# Patient Record
Sex: FEMALE | Race: WHITE | NOT HISPANIC OR LATINO | Employment: UNEMPLOYED | URBAN - METROPOLITAN AREA
[De-identification: names, ages, dates, MRNs, and addresses within clinical notes are randomized per-mention and may not be internally consistent; named-entity substitution may affect disease eponyms.]

---

## 2023-06-23 PROBLEM — J43.9 PULMONARY EMPHYSEMA: Status: ACTIVE | Noted: 2023-06-23

## 2023-11-10 PROBLEM — J93.9 PNEUMOTHORAX ON LEFT: Status: ACTIVE | Noted: 2023-11-10

## 2023-11-11 PROBLEM — K21.9 GASTROESOPHAGEAL REFLUX DISEASE: Status: ACTIVE | Noted: 2023-11-11

## 2023-11-11 PROBLEM — F32.A DEPRESSION: Status: ACTIVE | Noted: 2023-11-11

## 2023-11-11 PROBLEM — T79.7XXA SUBCUTANEOUS EMPHYSEMA: Status: ACTIVE | Noted: 2023-11-11

## 2023-11-12 PROBLEM — J96.21 ACUTE ON CHRONIC RESPIRATORY FAILURE WITH HYPOXIA: Status: ACTIVE | Noted: 2023-11-12

## 2023-11-12 PROBLEM — F41.9 ANXIETY: Status: ACTIVE | Noted: 2023-11-12

## 2023-11-14 PROBLEM — J44.9 COPD (CHRONIC OBSTRUCTIVE PULMONARY DISEASE): Status: ACTIVE | Noted: 2023-11-14

## 2023-11-21 PROBLEM — J95.811: Status: ACTIVE | Noted: 2023-11-21

## 2023-11-29 PROBLEM — J98.09 BRONCHIAL OBSTRUCTION: Status: ACTIVE | Noted: 2023-11-29

## 2023-12-03 PROBLEM — J44.1 COPD EXACERBATION: Status: ACTIVE | Noted: 2023-12-03

## 2023-12-03 PROBLEM — J44.1 COPD EXACERBATION: Status: ACTIVE | Noted: 2023-11-14

## 2023-12-06 PROBLEM — J86.0 BRONCHOPLEURAL FISTULA: Status: ACTIVE | Noted: 2023-12-06

## 2023-12-09 PROBLEM — E46 HYPOALBUMINEMIA DUE TO PROTEIN-CALORIE MALNUTRITION: Status: ACTIVE | Noted: 2023-12-09

## 2023-12-09 PROBLEM — D72.10 EOSINOPHILIA: Status: ACTIVE | Noted: 2023-12-09

## 2023-12-09 PROBLEM — E87.4 METABOLIC ALKALOSIS WITH RESPIRATORY ACIDOSIS: Status: ACTIVE | Noted: 2023-12-09

## 2023-12-09 PROBLEM — E88.09 HYPOALBUMINEMIA DUE TO PROTEIN-CALORIE MALNUTRITION: Status: ACTIVE | Noted: 2023-12-09

## 2023-12-11 PROBLEM — R09.82 POST-NASAL DRIP: Status: ACTIVE | Noted: 2023-12-11

## 2023-12-11 PROBLEM — E44.0 MODERATE PROTEIN-CALORIE MALNUTRITION: Status: ACTIVE | Noted: 2023-12-11

## 2023-12-20 ENCOUNTER — PATIENT OUTREACH (OUTPATIENT)
Dept: ADMINISTRATIVE | Facility: OTHER | Age: 63
End: 2023-12-20

## 2023-12-20 ENCOUNTER — TELEPHONE (OUTPATIENT)
Dept: ADMINISTRATIVE | Facility: CLINIC | Age: 63
End: 2023-12-20

## 2023-12-20 NOTE — PROGRESS NOTES
Pt request help with food insecurity. Gave pt number to enroll in [facility] On A[facility] [phone] or [phone]. Sending pt pantry food locations also.  Pt is on oxygen 24/7. I will follow on [facility] platform.

## 2023-12-20 NOTE — PROGRESS NOTES
CHW - Initial Contact    This Community Health Worker completed OR updated the Social Determinant of Health questionnaire with patient via telephone today.    Pt identified barriers of most importance are: Pt request help with food insecurity. Gave pt number to enroll in [facility] [phone] or [phone]. Sending pt pantry food locations also.  Pt is on oxygen 24/7.   Referrals to community agencies completed with patient/caregiver consent outside of [facility] include: yes  Referrals were put through [facility] - no:   Support and Services: Food Pantry, Food Delivery  Other information discussed the patient needs / wants help with: SDOH   Follow up required: yes  Follow-up Outreach - Due: 1/4/2024     
medium/compressible

## 2024-01-04 ENCOUNTER — PATIENT OUTREACH (OUTPATIENT)
Dept: ADMINISTRATIVE | Facility: OTHER | Age: 64
End: 2024-01-04

## 2024-02-12 PROBLEM — J96.21 ACUTE ON CHRONIC RESPIRATORY FAILURE WITH HYPOXIA: Status: RESOLVED | Noted: 2023-11-12 | Resolved: 2024-02-12

## 2024-02-23 PROBLEM — Z98.890 S/P BRONCHOSCOPY: Status: ACTIVE | Noted: 2024-02-23

## 2024-02-23 PROBLEM — E78.5 HYPERLIPIDEMIA: Status: ACTIVE | Noted: 2024-02-23

## 2024-02-23 PROBLEM — J96.11 CHRONIC HYPOXIC RESPIRATORY FAILURE: Status: ACTIVE | Noted: 2024-02-23

## 2024-02-24 PROBLEM — R07.9 CHEST PAIN: Status: ACTIVE | Noted: 2024-02-24

## 2024-02-26 PROBLEM — J93.12 SECONDARY SPONTANEOUS PNEUMOTHORAX: Status: ACTIVE | Noted: 2024-02-26

## 2024-02-27 PROBLEM — J93.12 SECONDARY SPONTANEOUS PNEUMOTHORAX: Status: RESOLVED | Noted: 2024-02-26 | Resolved: 2024-02-27

## 2024-02-27 PROBLEM — R07.9 CHEST PAIN: Status: RESOLVED | Noted: 2024-02-24 | Resolved: 2024-02-27

## 2024-02-28 ENCOUNTER — NURSE TRIAGE (OUTPATIENT)
Dept: ADMINISTRATIVE | Facility: CLINIC | Age: 64
End: 2024-02-28

## 2024-02-28 NOTE — TELEPHONE ENCOUNTER
[place]    PCP:  Savana Guzmán, NP    Pt escalated to PD Day 1 Tracker queue for worsening symptoms.  S/P hospital discharge on 2/27 for COPD, Anxiety, Centrilobular Emphysema, Hyperlipidemia, Chronic Hypoxic Respiratory Failure, CP, and Secondary Spontaneous Pneumothorax.  C/O excessive coughing, anxiety during coughing spells, ongoing SOB with activity, and ongoing difficulty clearing airway.  Denies fever and CP.  She is currently taking Prednisone.  She reports the only worsening symptom is the cough.  Per protocol, care advised is discuss with PCP and callback by Nurse within 1 hr.  Message routed high priority to Pulmonology.  Also, advised to contact her PCP for recommendations.  Pt VU.  Advised to call for worsening/questions/concerns.  VU.    Reason for Disposition   Condition / symptoms WORSE    Additional Information   Negative: Sounds like a life-threatening emergency to the triager   Negative: Patient sounds very sick or weak to the triager   Negative: Sounds like a serious complication to the triager    Protocols used: Post-Hospitalization Follow-up Call-A-[place]

## 2024-03-04 ENCOUNTER — PATIENT OUTREACH (OUTPATIENT)
Dept: ADMINISTRATIVE | Facility: OTHER | Age: 64
End: 2024-03-04

## 2024-03-04 NOTE — PROGRESS NOTES
CHW - Follow Up    This Community Health Worker completed a follow up visit with patient via telephone today.  Pt/Caregiver reported: Pt states she has been in the hospital in [place] and needs to gather her mail.  Community Health Worker provided:  I am resending resources to pt.   Follow up required: yes  Follow-up Outreach - Due: 3/18/2024

## 2024-03-07 ENCOUNTER — TELEPHONE (OUTPATIENT)
Dept: PHARMACY | Facility: CLINIC | Age: 64
End: 2024-03-07

## 2024-03-07 ENCOUNTER — PATIENT OUTREACH (OUTPATIENT)
Dept: ADMINISTRATIVE | Facility: OTHER | Age: 64
End: 2024-03-07

## 2024-03-07 ENCOUNTER — TELEPHONE (OUTPATIENT)
Dept: ADMINISTRATIVE | Facility: CLINIC | Age: 64
End: 2024-03-07

## 2024-03-07 NOTE — TELEPHONE ENCOUNTER
I have spoken with Kriss Bright and informed her of the Az&Me application process for Breztri and what's required to apply.  Kriss Bright will provide the following documents: Proof of household Income( such as social security statement, 1099 form, pension statement or 3 consecutive pay stubs, Copy of all Insurance cards( front and back), Printout from your Insurance or Pharmacy that shows how much you have spent on prescriptions this year, and Signed and dated HIPAA /Patient Information Forms        I will follow up with the patient in 5 business days.

## 2024-03-07 NOTE — TELEPHONE ENCOUNTER
I have confirmed with Ms. Bright by MY CHART that she does not need prescription assistance at this time. Ms. Bright stated   Never mind. It would take an entirely long period of time together all of that information. We will manage some way. Thank you for your offer to help.

## 2024-03-07 NOTE — PROGRESS NOTES
CHW - Follow Up    This Community Health Worker completed a follow up visit with patient via telephone today.  Pt/Caregiver reported: Pt is requesting assistance paying for [employer]. Can you please assist?    Community Health Worker provided: Pt is requesting assistance paying for [employer]. Can you please assist? Sent in basket request to Pulmonary.  Follow up required: yes  Follow-up Outreach - Due: 3/14/2024

## 2024-03-07 NOTE — PROGRESS NOTES
Pt is requesting assistance paying for [employer]. Sent in basket request to Pharmacy pt assistance team and pulmonary for assistance. I will follow on [facility] platform.

## 2024-03-07 NOTE — LETTER
March 7, 2024    Kriss Bright  [address] [address]             [facility] - Pharmacy Assistance  [address]  [facility] [address]  Fax: [phone] Dear Ms. Kriss Bright     It was a pleasure speaking with you. To follow up on our conversation on 3/7/2024, the Pharmacy Patient Assistance Program needs more information from you before we can submit your Breztri application to the Az&Me Program. Please return the following documents to the Pharmacy Patient Assistance office ASAP.  Fax requested documentation to [phone] or email [email or web address]. Documentation can also be mailed to address listed below       Proof of household Income( such as social security statement, 1099 form, pension statement or 3 consecutive pay stubs  Copy of all Insurance cards( front and back)  Printout from your Insurance or Pharmacy that shows how much you have spent on prescriptions this year  Signed and dated HIPAA /Patient Information Forms       Whats Next:     Once I receive your documentation and authorization from your Provider, your application will be submitted to the [facility] Assistance Program for review. Please be advised it will take 2 to 4 weeks for your application to be processed so you may have to purchase a month's supply of medication from your pharmacy to hold you over during the waiting period. You will be notified of approval or denial by The Program(mail) or myself.      If you have any questions or concerns, please give me a call         Sincerely   Barbara Pederson   Pharmacy Patient Assistance  [address] [facility] [address]  [place] [address]  Phone: [phone]  Fax: [phone]  Email: [email or web address]

## 2024-03-20 ENCOUNTER — PATIENT OUTREACH (OUTPATIENT)
Dept: ADMINISTRATIVE | Facility: OTHER | Age: 64
End: 2024-03-20

## 2024-03-26 NOTE — PROGRESS NOTES
CHW - Follow Up    This Community Health Worker completed a follow up visit with patient via telephone today.  Pt/Caregiver reported: Pt states she did get medication paperwork together for pharmacy pt assistance pharmacy. She was just overwhelmed at the time and didn't want to deal with it.   Community Health Worker provided: Gave pt PPAT number to follow up process.  Follow up required: yes  Follow-up Outreach - Due: 4/9/2024

## 2024-04-08 ENCOUNTER — PATIENT OUTREACH (OUTPATIENT)
Dept: ADMINISTRATIVE | Facility: OTHER | Age: 64
End: 2024-04-08

## 2024-04-08 NOTE — PROGRESS NOTES
CHW - Follow Up    This Community Health Worker completed a follow up visit with patient via telephone today.  Pt/Caregiver reported: Pt states she doesn't know if see sent in paperwork for AZ&me. She will check anf let me know when I call her in 2 weeks.    Community Health Worker provided:  If she is denied, I will asked doctor to change pt medication to an affordable one.   Follow up required:yes   Follow-up Outreach - Due: 4/22/2024

## 2024-04-19 ENCOUNTER — PATIENT OUTREACH (OUTPATIENT)
Dept: ADMINISTRATIVE | Facility: OTHER | Age: 64
End: 2024-04-19

## 2024-05-03 NOTE — PROGRESS NOTES
CHW - Case Closure    This Community Health Worker spoke to patient via telephone today.   Pt/Caregiver reported: Pt's insurance does pay for medication. She does not need assistance at this time.   Pt/Caregiver denied any additional needs at this time and agrees with episode closure at this time.  Provided patient with Community Health Worker's contact information and encouraged him/her to contact this Community Health Worker if additional needs arise.

## 2024-05-27 PROBLEM — J96.11 CHRONIC HYPOXIC RESPIRATORY FAILURE: Status: RESOLVED | Noted: 2024-02-23 | Resolved: 2024-05-27

## 2025-01-16 ENCOUNTER — TELEPHONE (OUTPATIENT)
Dept: TRANSPLANT | Facility: CLINIC | Age: 65
End: 2025-01-16

## 2025-01-17 ENCOUNTER — TELEPHONE (OUTPATIENT)
Dept: TRANSPLANT | Facility: CLINIC | Age: 65
End: 2025-01-17

## 2025-01-17 NOTE — LETTER
January 23, 2025    Samantha Lane  [address] [place] [address]  Phone: [phone]  Fax: [phone]        Dear Samantha Lane:    Patient: Kriss Bright   MR Number: [id number]   YOB: 1960     Thank you for the referral of Kriss Bright to our lung transplant program. Upon reviewing the information provided by your office, we find that Kriss Bright is not a potential candidate for lung transplantation at [facility] due to the following:      She is too highly sensitized. I would recommend referral to [facility] in [place] or other high volume centers.      Once again, we appreciate your referral to our center and we look forward to working with you in the future. If you have any questions or concerns regarding this decision, then please do not hesitate to contact me at [phone].    Sincerely,       Cristiana Watson D.O.  Medical Director, Lung Transplant  Pulmonary & Critical Care Medicine    [facility] [place]  [address] [place] [address]  [phone]

## 2025-01-17 NOTE — TELEPHONE ENCOUNTER
"1/23/25 - Decline letter sent to referring provider.    ----- Message from Cristiana Watson DO sent at 1/17/2025 12:32 PM CST -----    Regarding: RE: Lung Transplant Referral    cPRA is 95%... too highly sensitized for [facility] and she will be too highly sensitized for us as well.  Would recommend [facility] or other high volume centers for consideration.    ----- Message -----  From: La Courtney RN  Sent: 1/16/2025   7:00 PM CST  To: Cristiana Watson DO  Subject: Lung Transplant Referral                         Lung Transplant Referral Note    Referral from: [facility]    Lung diagnosis: COPD/Emphysema,     Age: 64 y.o.    Height/Weight/BMI:  HT: 5' 1.7" / WT:  79.7 kg /Body mass index is 32.5 kg/m².    Smoking history: Social History    Tobacco Use      Smoking status: Former        Packs/day: 0.00        Years: 1 pack/day for 40.0 years (40.0 ttl pk-yrs)        Types: Cigarettes        Start date: 1982        Quit date: 2022        Years since quitting: 3.0      Smokeless tobacco: Not on file  Nicotine (-); Cotinine elevated in 9/2024;  Per [facility] note: Last used vape one time in 9/13/24 . Started in 2020 and used occasionally. Quit a few months ago    PFT date: 12/19/2024  FVC          2.02 / 74.5  FEV1        0.51 / 23.7  FEV1/FVC     25  TLC          6.05 / 133.2  DLCO       5.60 / 31.2    6MWT date: 12/19/2024  1004 feet with O2 @6L; 86% on RA at rest; desaturated to 89% on 6L with exertion    CXR date: 12/17/2024  Impression:  Heart and mediastinum: Cardiomediastinal silhouette is normal in contour. Calcified right hilar lymph nodes.   Lungs and pleura: No pleural effusion or pneumothorax. Left upper lobe endobronchial valves with complete left upper lobe collapse. There is a 0.7 cm calcified granuloma in the right middle lobe. Extensive emphysematous and bullous changes throughout.   Bones and soft tissues: No acute osseous abnormality.       Chest CT date: 09/16/2024  Impression: "   1.    Severe emphysema.   2.    Endobronchial valves left upper lobe with complete upper lobe collapse.   3.    Large bulla in the left upper thorax.       Echo date: 12/19/204  Impression: LV EF is normal. Overall wall motion is normal.   RV systolic function is normal.   Normal diastolic function and LV filling pressures.       Other pertinent medical history:  depression, bipolar disorder, endobronchial valve placement with removal of valve from RLL on 5/13/24; left pneumothorax  ( EBV placed in 11/2023 at [facility]. She had EBV PAULETTE and post procedure Left pneumothorax and one of the four EBV were removed, placed chest tubes x 2 and used blood patch x 2. Was hospitalized for almost 2 weeks. By 1/25/24, 4 valves in the left side were removed . Bronchoscopy was done on 3/1/24 and 9 mm SVS in LB 7+8 (LLL charla-medial segment) and SVS 7 mm valve in LB5 were placed. In 5/2024, the zephyr valve from the right lower lobe superior segment was removed. Still has EBvalves in the left upper lobe)    Referred to [facility] - evaluation prolonged due to chewing 2 THC gummies  Declined at [facility]: per note on 1/10/25:  Declined- Highly sensitized   Surgical high risk (pneumothorax and chest tubes)  Psychosocial high risk.   Advised patient to follow up on the osteoporosis and Acid reflux     Class I: cPRA: 95%   A2, A68, A69, B7, B8, B18, B27, B35, B39, B41, B42, B45, B50, B54, B55, B56, B57, B60, B61, B62, B64, B67, B71, B72, B75, B76, B78, B81, B82    Class II: cPRA: 83%  DR8, DR11, DR12, DR13, DR14, DR15, DR16, DR17, DR18, DR52    Recommendations?

## 2025-01-17 NOTE — LETTER
January 23, 2025    Samantha Lane  [address] [place] [address]  Phone: [phone]  Fax: [phone]        Dear Samantha Lane:    Patient: Kriss Bright   MR Number: [id number]   YOB: 1960     Thank you for the referral of Kriss Bright to our lung transplant program. Upon reviewing the information provided by your office, we find that Kriss Bright is not a potential candidate for lung transplantation at [facility] due to the following:      She is too highly sensitized. I would recommend referral to [facility] or other high volume centers for lung transplant consideration.      Once again, we appreciate your referral to our center and we look forward to working with you in the future. If you have any questions or concerns regarding this decision, then please do not hesitate to contact me at [phone].    Sincerely,       Cristiana Watson D.O.  Medical Director, Lung Transplant  Pulmonary & Critical Care Medicine    [facility]  [address] [place] [address]  [phone]

## 2025-02-03 ENCOUNTER — TELEPHONE (OUTPATIENT)
Dept: TRANSPLANT | Facility: CLINIC | Age: 65
End: 2025-02-03

## 2025-02-03 NOTE — TELEPHONE ENCOUNTER
"----- Message from [facility] sent at 2/3/2025 10:01 AM CST -----  Consult/Advisory    Name Of Caller: Denilson Bright (Spouse)    Contact Preference?: [phone]    What is the nature of the call?: Requesting clarification of referral status    Additional Notes:  "Thank you for all that you do for our patients"    Contacted patient and her , Denilson. Mr. Oreilly stated that they had not received any communication regarding the lung transplant referral, therefore, they wanted to reach out. Notified them both that patient is not a candidate for lung transplant at our center. Informed them that she is highly sensitized. Explained to them both what that means. Notified them of Dr. Watson's instructions for a referral to a high volume center like [facility] since she was declined at [facility]. Informed them that a letter was sent to [facility], regarding this. Informed them that I would reach out to Samantha'ss office to determine if they received the letter. Both patient and her  verbalized their understanding.     Contacted Jenny at [facility] ([facility]). Inquired if they received the decline letter for patient. She stated that they did receive the letter. She stated that she would discuss with the provider and proceed accordingly. Provided Jenny with [facility] contact information.    Contacted patient's , Mr. Oreilly. Informed him that I contacted the provider's office and spoke with Jenny. Informed that the letter was received and she would discuss with Samantha upon her return. He verbalized his understanding.  "